# Patient Record
Sex: FEMALE | Race: WHITE | NOT HISPANIC OR LATINO | Employment: OTHER | URBAN - METROPOLITAN AREA
[De-identification: names, ages, dates, MRNs, and addresses within clinical notes are randomized per-mention and may not be internally consistent; named-entity substitution may affect disease eponyms.]

---

## 2020-12-01 ENCOUNTER — OFFICE VISIT (OUTPATIENT)
Dept: PODIATRY | Facility: CLINIC | Age: 70
End: 2020-12-01
Payer: MEDICARE

## 2020-12-01 VITALS
SYSTOLIC BLOOD PRESSURE: 181 MMHG | BODY MASS INDEX: 29.09 KG/M2 | HEART RATE: 59 BPM | HEIGHT: 63 IN | DIASTOLIC BLOOD PRESSURE: 93 MMHG | WEIGHT: 164.19 LBS

## 2020-12-01 DIAGNOSIS — M79.674 GREAT TOE PAIN, RIGHT: Primary | ICD-10-CM

## 2020-12-01 DIAGNOSIS — B35.1 DERMATOPHYTOSIS OF NAIL: ICD-10-CM

## 2020-12-01 PROCEDURE — 99202 PR OFFICE/OUTPT VISIT, NEW, LEVL II, 15-29 MIN: ICD-10-PCS | Mod: S$GLB,,, | Performed by: PODIATRIST

## 2020-12-01 PROCEDURE — 1159F MED LIST DOCD IN RCRD: CPT | Mod: S$GLB,,, | Performed by: PODIATRIST

## 2020-12-01 PROCEDURE — 1159F PR MEDICATION LIST DOCUMENTED IN MEDICAL RECORD: ICD-10-PCS | Mod: S$GLB,,, | Performed by: PODIATRIST

## 2020-12-01 PROCEDURE — 99999 PR PBB SHADOW E&M-NEW PATIENT-LVL II: CPT | Mod: PBBFAC,,, | Performed by: PODIATRIST

## 2020-12-01 PROCEDURE — 3008F PR BODY MASS INDEX (BMI) DOCUMENTED: ICD-10-PCS | Mod: CPTII,S$GLB,, | Performed by: PODIATRIST

## 2020-12-01 PROCEDURE — 99202 OFFICE O/P NEW SF 15 MIN: CPT | Mod: S$GLB,,, | Performed by: PODIATRIST

## 2020-12-01 PROCEDURE — 99999 PR PBB SHADOW E&M-NEW PATIENT-LVL II: ICD-10-PCS | Mod: PBBFAC,,, | Performed by: PODIATRIST

## 2020-12-01 PROCEDURE — 3008F BODY MASS INDEX DOCD: CPT | Mod: CPTII,S$GLB,, | Performed by: PODIATRIST

## 2020-12-01 RX ORDER — ROSUVASTATIN CALCIUM 5 MG/1
5 TABLET, COATED ORAL DAILY
COMMUNITY
Start: 2020-10-30

## 2020-12-01 RX ORDER — VITAMIN B COMPLEX
1 CAPSULE ORAL
COMMUNITY

## 2020-12-01 RX ORDER — ACETAMINOPHEN 500 MG
2 TABLET ORAL
COMMUNITY

## 2020-12-01 RX ORDER — ENALAPRIL MALEATE 5 MG/1
10 TABLET ORAL DAILY
COMMUNITY
Start: 2020-09-14

## 2020-12-15 NOTE — PROGRESS NOTES
Subjective:     Patient ID: Gabriela Drew is a 70 y.o. female.    Chief Complaint: Toe Pain (Right foot hellaux toe pain 5/10 when walking, non diabetic wears casual shoes with socks, PCP Dr. Keith )    Gabriela is a 70 y.o. female who presents to the podiatry clinic  with complaint of right hallux toe pain. Patient points to right hallux nail. Patient rates pain 5/10 when walking. Patient denies any recent history of trauma to the toe. Patient has no other pedal complaints at this time.      There is no problem list on file for this patient.      Medication List with Changes/Refills   Current Medications    ASCORBIC ACID, VITAMIN C, (VITAMIN C) 100 MG TABLET    Take by mouth.    B COMPLEX VITAMINS CAPSULE    Take 1 capsule by mouth.    ENALAPRIL (VASOTEC) 5 MG TABLET    Take 5 mg by mouth once daily.    OMEGA 3-DHA-EPA-FISH -1,000 MG CAP    Take 2 g by mouth.    ROSUVASTATIN (CRESTOR) 5 MG TABLET    Take 5 mg by mouth once daily.       Review of patient's allergies indicates:  No Known Allergies    History reviewed. No pertinent surgical history.    History reviewed. No pertinent family history.    Social History     Socioeconomic History    Marital status:      Spouse name: Not on file    Number of children: Not on file    Years of education: Not on file    Highest education level: Not on file   Occupational History    Not on file   Social Needs    Financial resource strain: Not on file    Food insecurity     Worry: Not on file     Inability: Not on file    Transportation needs     Medical: Not on file     Non-medical: Not on file   Tobacco Use    Smoking status: Current Every Day Smoker     Packs/day: 1.00     Years: 50.00     Pack years: 50.00     Types: Cigarettes   Substance and Sexual Activity    Alcohol use: Yes     Frequency: Monthly or less     Drinks per session: 1 or 2     Binge frequency: Less than monthly    Drug use: Never    Sexual activity: Not on file   Lifestyle     "Physical activity     Days per week: Not on file     Minutes per session: Not on file    Stress: Not on file   Relationships    Social connections     Talks on phone: Not on file     Gets together: Not on file     Attends Sikh service: Not on file     Active member of club or organization: Not on file     Attends meetings of clubs or organizations: Not on file     Relationship status: Not on file   Other Topics Concern    Not on file   Social History Narrative    Not on file       Vitals:    12/01/20 1454   BP: (!) 181/93   Pulse: (!) 59   Weight: 74.5 kg (164 lb 3.2 oz)   Height: 5' 3" (1.6 m)       Review of Systems   Constitutional: Negative for chills and fever.   Respiratory: Negative for shortness of breath.    Cardiovascular: Negative for chest pain, palpitations, orthopnea, claudication and leg swelling.   Gastrointestinal: Negative for diarrhea, nausea and vomiting.   Musculoskeletal: Negative for joint pain.   Skin: Negative for rash.   Neurological: Negative for dizziness, tingling, sensory change, focal weakness and weakness.   Psychiatric/Behavioral: Negative.            Objective:      PHYSICAL EXAM: Apperance: Alert and orient in no distress,well developed, and with good attention to grooming and body habits  LOWER EXTREMITY EXAM:  VASCULAR: Dorsalis pedis pulses 2/4 bilateral and Posterior Tibial pulses 2/4 bilateral. Capillary fill time <4 seconds bilateral. Mild edema observed bilateral. Varicosities absent bilateral. Skin temperature of the lower extremities is warm to warm, proximal to distal. Hair growth WNL bilateral.  DERMATOLOGICAL: No skin rashes, subcutaneous nodules, lesions, or ulcers observed bilateral. Nails 1 right thickened, and discolored with subungual debris. Webspaces 1,2,3,4 bilateral clean, dry and without evidence of break in skin integrity.  NEUROLOGICAL: Light touch, sharp-dull, proprioception all present and equal bilaterally.     MUSCULOSKELETAL: Muscle strength is " 5/5 for foot inverters, everters, plantarflexors, and dorsiflexors. Muscle tone is normal. Negative pain on palpation of nails right hallux. No pain on right hallux ROM.           Assessment:       Encounter Diagnoses   Name Primary?    Great toe pain, right - Right Foot Yes    Dermatophytosis of nail - Right Foot          Plan:   Great toe pain, right - Right Foot    Dermatophytosis of nail - Right Foot      I counseled the patient on her conditions, regarding findings of my examination, my impressions, and usual treatment plan.   Patient instructed to spray all shoes with Lysol disinfectant spray and let dry before wearing. Patient instructed to wash all socks in hot water and bleach.  Discuss treatment options for nail fungus.  I explained that fungus lives in a warm dark moist environment and therefore patient should make every attempt to keep feet clean and dry.  We discussed drying feet thoroughly after shower particularly between the toes and then applying powder between the toes and in the shoes.    For fungal toenails I prescribed topical medication to be used daily for up to a year.  We also discussed oral Lamisil but I did not recommend it as a first line of treatment since it is an internal medicine that may potentially have side effects, including liver problems. Patient elects for OTC topical treatment.   Patient to return as needed.               Oralia Samuel DPM  [facility]

## 2025-06-18 ENCOUNTER — LAB REQUISITION (OUTPATIENT)
Dept: LAB | Facility: HOSPITAL | Age: 75
End: 2025-06-18

## 2025-06-18 DIAGNOSIS — I69.354 HEMIPLEGIA AND HEMIPARESIS FOLLOWING CEREBRAL INFARCTION AFFECTING LEFT NON-DOMINANT SIDE: ICD-10-CM

## 2025-06-18 DIAGNOSIS — I10 ESSENTIAL (PRIMARY) HYPERTENSION: ICD-10-CM

## 2025-06-18 DIAGNOSIS — I63.9 CEREBRAL INFARCTION, UNSPECIFIED: ICD-10-CM

## 2025-06-18 LAB
ALBUMIN SERPL BCP-MCNC: 4.3 G/DL (ref 3.5–5.2)
ALP SERPL-CCNC: 84 UNIT/L (ref 40–150)
ALT SERPL W/O P-5'-P-CCNC: 25 UNIT/L (ref 10–44)
ANION GAP (OHS): 11 MMOL/L (ref 8–16)
AST SERPL-CCNC: 24 UNIT/L (ref 11–45)
BILIRUB DIRECT SERPL-MCNC: 0.1 MG/DL (ref 0.1–0.3)
BILIRUB SERPL-MCNC: 0.3 MG/DL (ref 0.1–1)
BUN SERPL-MCNC: 15 MG/DL (ref 8–23)
CALCIUM SERPL-MCNC: 9.6 MG/DL (ref 8.7–10.5)
CHLORIDE SERPL-SCNC: 104 MMOL/L (ref 95–110)
CHOLEST SERPL-MCNC: 189 MG/DL (ref 120–199)
CHOLEST/HDLC SERPL: 2.9 {RATIO} (ref 2–5)
CO2 SERPL-SCNC: 24 MMOL/L (ref 23–29)
CREAT SERPL-MCNC: 0.6 MG/DL (ref 0.5–1.4)
GFR SERPLBLD CREATININE-BSD FMLA CKD-EPI: >60 ML/MIN/1.73/M2
GLUCOSE SERPL-MCNC: 86 MG/DL (ref 70–110)
HDLC SERPL-MCNC: 66 MG/DL (ref 40–75)
HDLC SERPL: 34.9 % (ref 20–50)
LDLC SERPL CALC-MCNC: 100.4 MG/DL (ref 63–159)
NONHDLC SERPL-MCNC: 123 MG/DL
POTASSIUM SERPL-SCNC: 4.3 MMOL/L (ref 3.5–5.1)
PROT SERPL-MCNC: 7 GM/DL (ref 6–8.4)
SODIUM SERPL-SCNC: 139 MMOL/L (ref 136–145)
TRIGL SERPL-MCNC: 113 MG/DL (ref 30–150)

## 2025-06-18 PROCEDURE — 80053 COMPREHEN METABOLIC PANEL: CPT | Mod: PO | Performed by: FAMILY MEDICINE

## 2025-06-18 PROCEDURE — 82248 BILIRUBIN DIRECT: CPT | Mod: PO | Performed by: FAMILY MEDICINE

## 2025-06-18 PROCEDURE — 80061 LIPID PANEL: CPT | Mod: PO | Performed by: FAMILY MEDICINE
